# Patient Record
Sex: FEMALE | Race: WHITE | NOT HISPANIC OR LATINO | Employment: UNEMPLOYED | URBAN - METROPOLITAN AREA
[De-identification: names, ages, dates, MRNs, and addresses within clinical notes are randomized per-mention and may not be internally consistent; named-entity substitution may affect disease eponyms.]

---

## 2024-06-12 ENCOUNTER — HOSPITAL ENCOUNTER (EMERGENCY)
Facility: HOSPITAL | Age: 39
Discharge: HOME | End: 2024-06-12
Attending: EMERGENCY MEDICINE
Payer: COMMERCIAL

## 2024-06-12 VITALS
HEART RATE: 99 BPM | OXYGEN SATURATION: 97 % | SYSTOLIC BLOOD PRESSURE: 127 MMHG | TEMPERATURE: 98.8 F | RESPIRATION RATE: 18 BRPM | DIASTOLIC BLOOD PRESSURE: 87 MMHG

## 2024-06-12 DIAGNOSIS — E11.65 HYPERGLYCEMIA DUE TO DIABETES MELLITUS (HCC): Primary | ICD-10-CM

## 2024-06-12 LAB
ALBUMIN SERPL BCP-MCNC: 4.2 G/DL (ref 3.5–5)
ALP SERPL-CCNC: 61 U/L (ref 34–104)
ALT SERPL W P-5'-P-CCNC: 17 U/L (ref 7–52)
ANION GAP SERPL CALCULATED.3IONS-SCNC: 10 MMOL/L (ref 4–13)
AST SERPL W P-5'-P-CCNC: 24 U/L (ref 13–39)
ATRIAL RATE: 96 BPM
B-OH-BUTYR SERPL-MCNC: 2.73 MMOL/L (ref 0.02–0.27)
BASE EX.OXY STD BLDV CALC-SCNC: 83 % (ref 60–80)
BASE EXCESS BLDV CALC-SCNC: -2.4 MMOL/L
BILIRUB SERPL-MCNC: 0.91 MG/DL (ref 0.2–1)
BILIRUB UR QL STRIP: NEGATIVE
BUN SERPL-MCNC: 13 MG/DL (ref 5–25)
CALCIUM SERPL-MCNC: 9.1 MG/DL (ref 8.4–10.2)
CHLORIDE SERPL-SCNC: 99 MMOL/L (ref 96–108)
CLARITY UR: CLEAR
CO2 SERPL-SCNC: 23 MMOL/L (ref 21–32)
COLOR UR: YELLOW
CREAT SERPL-MCNC: 0.82 MG/DL (ref 0.6–1.3)
ERYTHROCYTE [DISTWIDTH] IN BLOOD BY AUTOMATED COUNT: 12.1 % (ref 11.6–15.1)
EXT PREGNANCY TEST URINE: NEGATIVE
EXT. CONTROL: NORMAL
GFR SERPL CREATININE-BSD FRML MDRD: 89 ML/MIN/1.73SQ M
GLUCOSE SERPL-MCNC: 281 MG/DL (ref 65–140)
GLUCOSE SERPL-MCNC: 304 MG/DL (ref 65–140)
GLUCOSE UR STRIP-MCNC: ABNORMAL MG/DL
HCO3 BLDV-SCNC: 21 MMOL/L (ref 24–30)
HCT VFR BLD AUTO: 39.5 % (ref 34.8–46.1)
HGB BLD-MCNC: 13.7 G/DL (ref 11.5–15.4)
HGB UR QL STRIP.AUTO: NEGATIVE
KETONES UR STRIP-MCNC: ABNORMAL MG/DL
LACTATE SERPL-SCNC: 0.7 MMOL/L (ref 0.5–2)
LEUKOCYTE ESTERASE UR QL STRIP: NEGATIVE
LIPASE SERPL-CCNC: 8 U/L (ref 11–82)
MCH RBC QN AUTO: 33.3 PG (ref 26.8–34.3)
MCHC RBC AUTO-ENTMCNC: 34.7 G/DL (ref 31.4–37.4)
MCV RBC AUTO: 96 FL (ref 82–98)
NITRITE UR QL STRIP: NEGATIVE
O2 CT BLDV-SCNC: 17 ML/DL
P AXIS: 99 DEGREES
PCO2 BLDV: 32.4 MM HG (ref 42–50)
PH BLDV: 7.43 [PH] (ref 7.3–7.4)
PH UR STRIP.AUTO: 5.5 [PH] (ref 4.5–8)
PLATELET # BLD AUTO: 206 THOUSANDS/UL (ref 149–390)
PMV BLD AUTO: 10 FL (ref 8.9–12.7)
PO2 BLDV: 47.9 MM HG (ref 35–45)
POTASSIUM SERPL-SCNC: 4.9 MMOL/L (ref 3.5–5.3)
PR INTERVAL: 148 MS
PROT SERPL-MCNC: 6.7 G/DL (ref 6.4–8.4)
PROT UR STRIP-MCNC: NEGATIVE MG/DL
QRS AXIS: 96 DEGREES
QRSD INTERVAL: 78 MS
QT INTERVAL: 354 MS
QTC INTERVAL: 447 MS
RBC # BLD AUTO: 4.12 MILLION/UL (ref 3.81–5.12)
SODIUM SERPL-SCNC: 132 MMOL/L (ref 135–147)
SP GR UR STRIP.AUTO: 1.02 (ref 1–1.03)
T WAVE AXIS: 120 DEGREES
UROBILINOGEN UR QL STRIP.AUTO: 0.2 E.U./DL
VENTRICULAR RATE: 96 BPM
WBC # BLD AUTO: 10.27 THOUSAND/UL (ref 4.31–10.16)

## 2024-06-12 PROCEDURE — 82805 BLOOD GASES W/O2 SATURATION: CPT

## 2024-06-12 PROCEDURE — 81025 URINE PREGNANCY TEST: CPT

## 2024-06-12 PROCEDURE — 99284 EMERGENCY DEPT VISIT MOD MDM: CPT

## 2024-06-12 PROCEDURE — 82948 REAGENT STRIP/BLOOD GLUCOSE: CPT

## 2024-06-12 PROCEDURE — 36415 COLL VENOUS BLD VENIPUNCTURE: CPT

## 2024-06-12 PROCEDURE — 99284 EMERGENCY DEPT VISIT MOD MDM: CPT | Performed by: EMERGENCY MEDICINE

## 2024-06-12 PROCEDURE — 93010 ELECTROCARDIOGRAM REPORT: CPT | Performed by: INTERNAL MEDICINE

## 2024-06-12 PROCEDURE — 83690 ASSAY OF LIPASE: CPT

## 2024-06-12 PROCEDURE — 96365 THER/PROPH/DIAG IV INF INIT: CPT

## 2024-06-12 PROCEDURE — 82010 KETONE BODYS QUAN: CPT

## 2024-06-12 PROCEDURE — 83605 ASSAY OF LACTIC ACID: CPT

## 2024-06-12 PROCEDURE — 81003 URINALYSIS AUTO W/O SCOPE: CPT

## 2024-06-12 PROCEDURE — 93005 ELECTROCARDIOGRAM TRACING: CPT

## 2024-06-12 PROCEDURE — 80053 COMPREHEN METABOLIC PANEL: CPT

## 2024-06-12 PROCEDURE — 85027 COMPLETE CBC AUTOMATED: CPT

## 2024-06-12 RX ORDER — SODIUM CHLORIDE 9 MG/ML
3 INJECTION INTRAVENOUS
Status: DISCONTINUED | OUTPATIENT
Start: 2024-06-12 | End: 2024-06-12 | Stop reason: HOSPADM

## 2024-06-12 RX ORDER — SODIUM CHLORIDE, SODIUM GLUCONATE, SODIUM ACETATE, POTASSIUM CHLORIDE, MAGNESIUM CHLORIDE, SODIUM PHOSPHATE, DIBASIC, AND POTASSIUM PHOSPHATE .53; .5; .37; .037; .03; .012; .00082 G/100ML; G/100ML; G/100ML; G/100ML; G/100ML; G/100ML; G/100ML
1000 INJECTION, SOLUTION INTRAVENOUS ONCE
Status: COMPLETED | OUTPATIENT
Start: 2024-06-12 | End: 2024-06-12

## 2024-06-12 RX ADMIN — SODIUM CHLORIDE, SODIUM GLUCONATE, SODIUM ACETATE, POTASSIUM CHLORIDE, MAGNESIUM CHLORIDE, SODIUM PHOSPHATE, DIBASIC, AND POTASSIUM PHOSPHATE 1000 ML: .53; .5; .37; .037; .03; .012; .00082 INJECTION, SOLUTION INTRAVENOUS at 17:52

## 2024-06-12 NOTE — DISCHARGE INSTRUCTIONS
Viridiana Gupta was seen and evaluated today in the emergency department over your concern of hyperglycemia and diabetic ketoacidosis.  The workup that we performed showed upper glycemia without diabetic ketoacidosis.  Please return to the emergency department if you experience worsening nausea, vomiting, weakness, fatigue or any other signs and symptoms that may be concerning to you.  Please follow-up with your primary care doctor within 1 day.  All questions were answered prior to discharge.  Thank you for choosing [facility] for your care.    Follow-up with your primary care provider in 1 to 3 days.  Please exchange your insulin pump.

## 2024-06-12 NOTE — ED PROVIDER NOTES
History  Chief Complaint   Patient presents with    Vomiting     Pt began vomiting around 1630 and felt her heart racing. Pt is a type 1 diabetic and wanted to get check due to previous hx of DKA. Last sugar is 333     39-year-old female, type I diabetic, presents emergency department complaining of nausea and vomiting for 1 episode today.  Based on chart review patient did have recent hospitalization in January 2024 for DKA without coma.  Patient states she has had a few hospitalizations before for diabetic ketoacidosis and is concerned given her initial presentations are overall mild however with palpitations, nausea and vomiting.  She states during this episode of nausea and vomiting she did have palpitations.  Since that period of time she does feel much improved.  She does wear a Dexcom which shows blood glucose level around 304.  She states her sugars have been elevated over the last few days.  She mentions that she has not changed her insulin pump on the regular basis and needs to do so.  She does have a new insulin pump at her residence.        None       Past Medical History:   Diagnosis Date    Diabetes mellitus (HCC)        Past Surgical History:   Procedure Laterality Date    MAMMO NEEDLE LOCALIZATION LEFT (ALL INC) Left 11/9/2022    MASTECTOMY         History reviewed. No pertinent family history.  I have reviewed and agree with the history as documented.    E-Cigarette/Vaping     E-Cigarette/Vaping Substances     Social History     Tobacco Use    Smoking status: Never    Smokeless tobacco: Never   Substance Use Topics    Alcohol use: Not Currently     Comment: social    Drug use: Never        Review of Systems   Constitutional:  Negative for chills and fever.   Cardiovascular:  Positive for palpitations.   Gastrointestinal:  Positive for nausea and vomiting. Negative for abdominal pain.   All other systems reviewed and are negative.      Physical Exam  ED Triage Vitals [06/12/24 1705]   Temperature  Pulse Respirations Blood Pressure SpO2   98.8 °F (37.1 °C) 99 18 127/87 97 %      Temp Source Heart Rate Source Patient Position - Orthostatic VS BP Location FiO2 (%)   Temporal Monitor -- Left arm --      Pain Score       --             Orthostatic Vital Signs  Vitals:    06/12/24 1705   BP: 127/87   Pulse: 99       Physical Exam  Vitals and nursing note reviewed.   Constitutional:       General: She is not in acute distress.     Appearance: She is well-developed.   HENT:      Head: Normocephalic and atraumatic.   Eyes:      Conjunctiva/sclera: Conjunctivae normal.   Cardiovascular:      Rate and Rhythm: Normal rate and regular rhythm.      Heart sounds: No murmur heard.  Pulmonary:      Effort: Pulmonary effort is normal. No respiratory distress.      Breath sounds: Normal breath sounds.   Abdominal:      Palpations: Abdomen is soft.      Tenderness: There is no abdominal tenderness. There is no guarding.      Hernia: No hernia is present.       Musculoskeletal:         General: No swelling.      Cervical back: Neck supple.      Right lower leg: No edema.      Left lower leg: No edema.   Skin:     General: Skin is warm and dry.      Capillary Refill: Capillary refill takes less than 2 seconds.   Neurological:      Mental Status: She is alert.   Psychiatric:         Mood and Affect: Mood normal.         ED Medications  Medications   sodium chloride (PF) 0.9 % injection 3 mL (has no administration in time range)   multi-electrolyte (ISOLYTE-S PH 7.4) bolus 1,000 mL (0 mL Intravenous Stopped 6/12/24 1903)       Diagnostic Studies  Results Reviewed       Procedure Component Value Units Date/Time    Comprehensive metabolic panel [[id number]]  (Abnormal) Collected: 06/12/24 1749    Lab Status: Final result Specimen: Blood from Arm, Right Updated: 06/12/24 1830     Sodium 132 mmol/L      Potassium 4.9 mmol/L      Chloride 99 mmol/L      CO2 23 mmol/L      ANION GAP 10 mmol/L      BUN 13 mg/dL      Creatinine 0.82 mg/dL       Glucose 281 mg/dL      Calcium 9.1 mg/dL      AST 24 U/L      ALT 17 U/L      Alkaline Phosphatase 61 U/L      Total Protein 6.7 g/dL      Albumin 4.2 g/dL      Total Bilirubin 0.91 mg/dL      eGFR 89 ml/min/1.73sq m     Narrative:      National Kid[employer] Foundation guidelines for Chronic Kidney Disease (CKD):     Stage 1 with normal or high GFR (GFR > 90 mL/min/1.73 square meters)    Stage 2 Mild CKD (GFR = 60-89 mL/min/1.73 square meters)    Stage 3A Moderate CKD (GFR = 45-59 mL/min/1.73 square meters)    Stage 3B Moderate CKD (GFR = 30-44 mL/min/1.73 square meters)    Stage 4 Severe CKD (GFR = 15-29 mL/min/1.73 square meters)    Stage 5 End Stage CKD (GFR <15 mL/min/1.73 square meters)  Note: GFR calculation is accurate only with a steady state creatinine    Beta Hydroxybutyrate [[id number]]  (Abnormal) Collected: 06/12/24 1749    Lab Status: Final result Specimen: Blood from Arm, Right Updated: 06/12/24 1830     Beta- Hydroxybutyrate 2.73 mmol/L     Lipase [[id number]]  (Abnormal) Collected: 06/12/24 1749    Lab Status: Final result Specimen: Blood from Arm, Right Updated: 06/12/24 1830     Lipase 8 u/L     Lactic acid, plasma (w/reflex if result > 2.0) [[id number]]  (Normal) Collected: 06/12/24 1749    Lab Status: Final result Specimen: Blood from Arm, Right Updated: 06/12/24 1830     LACTIC ACID 0.7 mmol/L     Narrative:      Result may be elevated if tourniquet was used during collection.    CBC [[id number]]  (Abnormal) Collected: 06/12/24 1749    Lab Status: Final result Specimen: Blood from Arm, Right Updated: 06/12/24 1812     WBC 10.27 Thousand/uL      RBC 4.12 Million/uL      Hemoglobin 13.7 g/dL      Hematocrit 39.5 %      MCV 96 fL      MCH 33.3 pg      MCHC 34.7 g/dL      RDW 12.1 %      Platelets 206 Thousands/uL      MPV 10.0 fL     POCT pregnancy, urine [[id number]]  (Normal) Resulted: 06/12/24 1811    Lab Status: Final result Specimen: Urine Updated: 06/12/24 1811     EXT Preg Test, Ur  Negative     Control Valid    Blood gas, venous [[id number]]  (Abnormal) Collected: 06/12/24 1749    Lab Status: Final result Specimen: Blood from Arm, Right Updated: 06/12/24 1806     pH, Kermit 7.429     pCO2, Kermit 32.4 mm Hg      pO2, Kermit 47.9 mm Hg      HCO3, Kermit 21.0 mmol/L      Base Excess, Kermit -2.4 mmol/L      O2 Content, Kermit 17.0 ml/dL      O2 HGB, VENOUS 83.0 %     Urine Macroscopic, POC [[id number]]  (Abnormal) Collected: 06/12/24 1804    Lab Status: Final result Specimen: Urine Updated: 06/12/24 1806     Color, UA Yellow     Clarity, UA Clear     pH, UA 5.5     Leukocytes, UA Negative     Nitrite, UA Negative     Protein, UA Negative mg/dl      Glucose,  (1/2%) mg/dl      Ketones, UA >=160 (4+) mg/dl      Urobilinogen, UA 0.2 E.U./dl      Bilirubin, UA Negative     Occult Blood, UA Negative     Specific Gravity, UA 1.025    Narrative:      CLINITEK RESULT    Fingerstick Glucose (POCT) [[id number]]  (Abnormal) Collected: 06/12/24 1706    Lab Status: Final result Specimen: Blood Updated: 06/12/24 1707     POC Glucose 304 mg/dl                    No orders to display         Procedures  ECG 12 Lead Documentation Only    Date/Time: 6/12/2024 5:34 PM    Performed by: Cabrera Nicole DO  Authorized by: Cabrera Nicole DO    Indications / Diagnosis:  Palpitations  ECG reviewed by me, the ED Provider: yes    Patient location:  ED  Previous ECG:     Previous ECG:  Compared to current    Similarity:  No change    Comparison to cardiac monitor: Yes    Interpretation:     Interpretation: normal    Rate:     ECG rate:  96    ECG rate assessment: normal    Rhythm:     Rhythm: sinus rhythm    Ectopy:     Ectopy: none    QRS:     QRS axis:  Right    QRS intervals:  Normal  Conduction:     Conduction: normal    ST segments:     ST segments:  Normal  T waves:     T waves: normal          ED Course  ED Course as of 06/12/24 2013 Wed Jun 12, 2024   1834 Blood gas, venous(!)  Bicarb within normal limits, normal anion  "gap.  pH within normal limits.  Doubt diabetic ketoacidosis at this time.  Glucose is elevated at 281.  Sodium decreased at 132 however likely artificially low due to hyperglycemia.  Lactic acid is 0.7.  Plus for ketones in urine.  Pregnancy negative.                                       Medical Decision Making      History and physical exam most consistent with hyperglycemia. However, differential diagnosis included but not limited to HHS, DKA, electrolyte or metabolic abnormality.     Plan: DKA labs, IV fluids, repeat evaluation, ECG    View ED course above for further discussion on patient workup.     On review of previous records hospitalization for diabetic ketoacidosis.    All labs reviewed and utilized in the medical decision making process  All radiology studies independently viewed by me and interpreted by the radiologist.  I reviewed all testing with the patient.     Upon re-evaluation patient feels much improved she was explained of all test results.  Patient not in diabetic ketoacidosis however does have elevated blood glucose level with increasing ketones.  Bicarb within normal limits and no anion gap.  Instructed to follow-up with your primary care provider or endocrinologist.  To change her insulin pump she verbalized understanding of discharge instructions.    Disposition: Discharge    Portions of the record may have been created with voice recognition software. Occasional wrong word or \"sound a like\" substitutions may have occurred due to the inherent limitations of voice recognition software. Read the chart carefully and recognize, using context, where substitutions have occurred.      Amount and/or Complexity of Data Reviewed  Labs: ordered. Decision-making details documented in ED Course.    Risk  Prescription drug management.          Disposition  Final diagnoses:   Hyperglycemia due to diabetes mellitus (HCC)     Time reflects when diagnosis was documented in both MDM as applicable and the " Disposition within this note       Time User Action Codes Description Comment    6/12/2024  6:49 PM Cabrera Nicole [R73.9] Hyperglycemia     6/12/2024  6:49 PM Cabrera Nicole Remove [R73.9] Hyperglycemia     6/12/2024  6:49 PM Cabrera Nicole [E11.65] Hyperglycemia due to diabetes mellitus (HCC)           ED Disposition       ED Disposition   Discharge    Condition   Stable    Date/Time   Wed Jun 12, 2024  6:47 PM    Comment   Viridiana Gupta discharge to home/self care.                   Follow-up Information       Follow up With Specialties Details Why Contact Info Additional Information    [facility] Emergency Department Emergency Medicine Go to  If symptoms worsen [address] [place] [id number] [phone] [facility] Emergency Department, [address], [place], [address] [phone]            There are no discharge medications for this patient.    No discharge procedures on file.    PDMP Review       None             ED Provider  Attending physically available and evaluated Viridiana Gupta. I managed the patient along with the ED Attending.    Electronically Signed by           Cabrera Nicole DO  06/12/24 2013

## 2024-06-17 NOTE — ED ATTENDING ATTESTATION
6/12/2024  IRahul DO, saw and evaluated the patient. I have discussed the patient with the resident/non-physician practitioner and agree with the resident's/non-physician practitioner's findings, Plan of Care, and MDM as documented in the resident's/non-physician practitioner's note, except where noted. All available labs and Radiology studies were reviewed.  I was present for key portions of any procedure(s) performed by the resident/non-physician practitioner and I was immediately available to provide assistance.       At this point I agree with the current assessment done in the Emergency Department.  I have conducted an independent evaluation of this patient a history and physical is as follows: 39-year-old female with type 1 diabetes presents with noted hyperglycemia nausea 1 episode of vomiting.  Patient has no abdominal discomfort or pain she does have an insulin pump that notes needs to be changed with noted some redness around the site, no fevers or chills,    ROS: per resident physician note    Gen: NAD, AA&Ox3  HEENT: PERRL, EOMI  Neck: supple  CV: RRR  Lungs: CTA B/L  Abdomen: soft, NT/ND  Ext: no swelling or deformity  Neuro: 5/5 strength all extremities, sensation grossly intact  Skin: no rash      Differential diagnosis includes DKA, hyperglycemia, dehydration,    Labs reassuring with no evidence of acidosis no elevation in anion gap 1 L of normal saline given in the emergency department and plan on discharge    Pt re-examined and evaluated after testing and treatment. Spoke with the patient and feeling improved and sxs have resolved. Will discharge home with close f/u with pcp and instructed to return to the ED if sxs worsen or continue. Pt agrees with the plan for discharge and feels comfortable to go home with proper f/u. Advised to return for worsening or additional problems. Diagnostic tests were reviewed and questions answered. Diagnosis, care plan and treatment options were  discussed. The patient understand instructions and will follow up as directed.  Counseling: I had a detailed discussion with the patient and/or guardian regarding: the historical points, exam findings, and any diagnostic results supporting the discharge diagnosis, lab results, radiology results, discharge instructions reviewed with patient and/or family/caregiver and understanding was verbalized. Instructions given to return to the emergency department if symptoms worsen or persist, or if there are any questions or concerns that arise at home.  All labs reviewed and utilized in the medical decision making process  All radiology studies independently viewed by me and interpreted by the radiologist.    ED Course         Critical Care Time  Procedures